# Patient Record
Sex: FEMALE | Race: WHITE | NOT HISPANIC OR LATINO | URBAN - METROPOLITAN AREA
[De-identification: names, ages, dates, MRNs, and addresses within clinical notes are randomized per-mention and may not be internally consistent; named-entity substitution may affect disease eponyms.]

---

## 2017-08-24 ENCOUNTER — EMERGENCY (EMERGENCY)
Facility: HOSPITAL | Age: 22
LOS: 1 days | Discharge: HOME | End: 2017-08-24
Attending: EMERGENCY MEDICINE | Admitting: EMERGENCY MEDICINE
Payer: COMMERCIAL

## 2017-08-24 VITALS
OXYGEN SATURATION: 100 % | SYSTOLIC BLOOD PRESSURE: 127 MMHG | TEMPERATURE: 98 F | DIASTOLIC BLOOD PRESSURE: 87 MMHG | HEART RATE: 75 BPM

## 2017-08-24 PROCEDURE — 99284 EMERGENCY DEPT VISIT MOD MDM: CPT | Mod: 25

## 2017-08-24 PROCEDURE — 93010 ELECTROCARDIOGRAM REPORT: CPT | Mod: NC

## 2017-08-24 PROCEDURE — 71020: CPT | Mod: 26

## 2017-08-24 PROCEDURE — 71046 X-RAY EXAM CHEST 2 VIEWS: CPT

## 2017-08-24 PROCEDURE — 99283 EMERGENCY DEPT VISIT LOW MDM: CPT | Mod: 25

## 2017-08-24 PROCEDURE — 93005 ELECTROCARDIOGRAM TRACING: CPT

## 2017-08-24 RX ORDER — NICOTINE POLACRILEX 2 MG
1 GUM BUCCAL
Qty: 14 | Refills: 0 | OUTPATIENT
Start: 2017-08-24 | End: 2017-09-07

## 2017-08-24 RX ADMIN — Medication 30 MILLILITER(S): at 09:53

## 2017-08-24 NOTE — ED PROVIDER NOTE - OBJECTIVE STATEMENT
21F with hx of Anxiety/Depression, recently started on Celexa about 5-6 weeks ago, p/w chest tightness and palpitations since waking up. pt admits to being incredibly stressed about her parents being away overseas and as a result has been smoking frequently to try and calm herself. she is worried about the health risks associated with smoking and has been causing even more anxiety. pt denies any current cp, cough, fever, chills. (+) mild epigastric burning, she tried taking omeprazole at home earlier w/o improvement. LMP years ago 2/2 OCPs. No recent travel, hairloss, heat/cold intolerance, neck 21F with hx of Anxiety/Depression, recently started on Celexa about 5-6 weeks ago, p/w chest tightness and palpitations since waking up. pt admits to being incredibly stressed about her parents being away overseas and as a result has been smoking frequently to try and calm herself. she is worried about the health risks associated with smoking and has been causing even more anxiety. pt denies any current cp, cough, fever, chills. (+) mild epigastric burning, she tried taking omeprazole at home earlier w/o improvement. LMP years ago 2/2 OCPs. No recent travel, hair loss, heat/cold intolerance, neck   pain. Denies SI/HI.

## 2017-08-24 NOTE — ED PROVIDER NOTE - PHYSICAL EXAMINATION
Attending MD Crabtree: A & O x 3, NAD, tearful at times, EOMI b/l, PERRL b/l; lungs CTAB, heart with reg rhythm without murmur; abdomen soft NTND; extremities with no edema; affect appropriate. neuro exam non focal with no motor or sensory deficits.

## 2017-08-24 NOTE — ED PROVIDER NOTE - ATTENDING CONTRIBUTION TO CARE
Attending MD Crabtree:  I personally have seen and examined this patient.  Resident note reviewed and agree on plan of care and except where noted.  See HPI, PE, and MDM for details.       21F with depression and anxiety recently started on Celexa presenting with palpitations, chest pain and dyspnea, normal exam, normal vitals. EKG without ischemic changes, no arrhythmia. Symptoms seem related to emotional stress so likely anxiety related/panic attack. Patient raised concern about PE as she is smoking and on OCPs, explained to patient that this is very unlikely as her dyspnea symptoms have resolved with reassurance and she is not tachycardic or hypoxic at this time. Will obtain CXR, observe briefly

## 2017-08-24 NOTE — ED PROVIDER NOTE - MEDICAL DECISION MAKING DETAILS
21F with anxiety and palpitations, chest tightness and epigastric burning. Recently started Celexa- possible medication side effect. EKG NS, w/o evidence of short ND, WPW, Brugada, HOCM or LVH. Will obtain ucg, CXR. Declining Psych eval at this time.

## 2017-08-24 NOTE — ED PROVIDER NOTE - CONSTITUTIONAL, MLM
normal... Well appearing, well nourished, awake, alert, oriented to person, place, time/situation, Crying/Anxious.

## 2018-07-15 NOTE — ED ADULT NURSE NOTE - OBJECTIVE STATEMENT
21 year old ambulatory female presents to ED c/o anxiety/palpitations/chest pain since this AM.  Patient denies SI, HI or any hallucinations but appears tearful.  Patient states she is stressed because her dog is in the hospital and her parents are out of town and she has been smoking cigarettes and also taking her birth control pills and is concerned for PE. Patient is afebrile, EKG preformed. Lung sounds clear. Abd nondistended nontender. Skin warm, dry intact. In no acute distress. Grandmother and aunt at bedside.
CAD (coronary artery disease)

## 2019-04-08 ENCOUNTER — RESULT REVIEW (OUTPATIENT)
Age: 24
End: 2019-04-08

## 2021-06-10 PROBLEM — F32.9 MAJOR DEPRESSIVE DISORDER, SINGLE EPISODE, UNSPECIFIED: Chronic | Status: ACTIVE | Noted: 2017-08-24

## 2021-06-10 PROBLEM — F41.9 ANXIETY DISORDER, UNSPECIFIED: Chronic | Status: ACTIVE | Noted: 2017-08-24

## 2021-06-15 ENCOUNTER — APPOINTMENT (OUTPATIENT)
Dept: DISASTER EMERGENCY | Facility: OTHER | Age: 26
End: 2021-06-15
Payer: COMMERCIAL

## 2021-06-15 PROCEDURE — 0001A: CPT

## 2021-07-06 ENCOUNTER — APPOINTMENT (OUTPATIENT)
Dept: DISASTER EMERGENCY | Facility: OTHER | Age: 26
End: 2021-07-06
Payer: COMMERCIAL

## 2021-07-06 PROCEDURE — 0002A: CPT

## 2021-08-25 ENCOUNTER — EMERGENCY (EMERGENCY)
Facility: HOSPITAL | Age: 26
LOS: 1 days | Discharge: HOME | End: 2021-08-25
Attending: EMERGENCY MEDICINE
Payer: COMMERCIAL

## 2021-08-25 VITALS
RESPIRATION RATE: 20 BRPM | WEIGHT: 134.92 LBS | OXYGEN SATURATION: 99 % | TEMPERATURE: 98 F | DIASTOLIC BLOOD PRESSURE: 74 MMHG | HEART RATE: 72 BPM | SYSTOLIC BLOOD PRESSURE: 123 MMHG | HEIGHT: 63 IN

## 2021-08-25 VITALS
SYSTOLIC BLOOD PRESSURE: 121 MMHG | HEART RATE: 63 BPM | DIASTOLIC BLOOD PRESSURE: 75 MMHG | OXYGEN SATURATION: 100 % | RESPIRATION RATE: 18 BRPM

## 2021-08-25 PROCEDURE — G1004: CPT

## 2021-08-25 PROCEDURE — 72125 CT NECK SPINE W/O DYE: CPT | Mod: 26,ME

## 2021-08-25 PROCEDURE — 72125 CT NECK SPINE W/O DYE: CPT | Mod: ME

## 2021-08-25 PROCEDURE — 99284 EMERGENCY DEPT VISIT MOD MDM: CPT

## 2021-08-25 PROCEDURE — 99284 EMERGENCY DEPT VISIT MOD MDM: CPT | Mod: 25

## 2021-08-25 RX ORDER — ACETAMINOPHEN 500 MG
975 TABLET ORAL ONCE
Refills: 0 | Status: COMPLETED | OUTPATIENT
Start: 2021-08-25 | End: 2021-08-25

## 2021-08-25 RX ORDER — LIDOCAINE 4 G/100G
1 CREAM TOPICAL ONCE
Refills: 0 | Status: COMPLETED | OUTPATIENT
Start: 2021-08-25 | End: 2021-08-25

## 2021-08-25 RX ORDER — IBUPROFEN 200 MG
600 TABLET ORAL ONCE
Refills: 0 | Status: COMPLETED | OUTPATIENT
Start: 2021-08-25 | End: 2021-08-25

## 2021-08-25 RX ADMIN — Medication 600 MILLIGRAM(S): at 15:57

## 2021-08-25 RX ADMIN — LIDOCAINE 1 PATCH: 4 CREAM TOPICAL at 15:57

## 2021-08-25 RX ADMIN — Medication 975 MILLIGRAM(S): at 15:57

## 2021-08-25 NOTE — ED PROVIDER NOTE - PATIENT PORTAL LINK FT
You can access the FollowMyHealth Patient Portal offered by [facility] by registering at the following website: [email or web address]. By joining [facility]’s FollowMyHealth portal, you will also be able to view your health information using other applications (apps) compatible with our system.

## 2021-08-25 NOTE — ED PROVIDER NOTE - PROGRESS NOTE DETAILS
Pt placed in c-collar given midline ttp on exam   Leia Alonso PA-C attending Kody: pt signed out to me by Dr. Madera at usual time of shift change pending CT imaging. Ct results reviewed at length with patient. Feeling better after medications. Pt reexamined, pt with +b/l paracervical muscle tenderness and spasm, no midline cspine tenderness. Will dc with copy of test results, close PMD follow-up and strict return precautions

## 2021-08-25 NOTE — ED PROVIDER NOTE - NSFOLLOWUPINSTRUCTIONS_ED_ALL_ED_FT
Stay well hydrated.  Take Motrin/Tylenol as directed as needed for pain. Take Motrin with food.  You may also take Tizanidine as directed as needed for muscle spasm  Follow-up with your primary doctor within 1-2 days  Bring a copy of your results with you  Return to an ER for worsening symptoms or any other concerns.

## 2021-08-25 NOTE — ED PROVIDER NOTE - PHYSICAL EXAMINATION
CONSTITUTIONAL: Patient is awake, alert and oriented x 3. Patient is well appearing and in no acute distress  HEAD: NCAT  LUNGS: CTA B/L  HEART: RRR  ABDOMEN: Soft nd/nttp, no rebound or guarding  EXTREMITY: no edema or calf tenderness b/l, FROM upper and lower ext b/l. + Cervical ttp both in the b/l paraspinal regions R>L and midline. No midline thoracic or lumbar ttp. B/l trapezius ttp   SKIN: with no rash or lesions  NEURO: Cn3-12 grossly intact. Strength5/5UE/LE.NmlSensation.Gait normal
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
## 2021-09-10 ENCOUNTER — TRANSCRIPTION ENCOUNTER (OUTPATIENT)
Age: 26
End: 2021-09-10

## 2021-12-08 ENCOUNTER — TRANSCRIPTION ENCOUNTER (OUTPATIENT)
Age: 26
End: 2021-12-08